# Patient Record
Sex: MALE | Race: WHITE | NOT HISPANIC OR LATINO
[De-identification: names, ages, dates, MRNs, and addresses within clinical notes are randomized per-mention and may not be internally consistent; named-entity substitution may affect disease eponyms.]

---

## 2020-06-05 PROBLEM — Z00.00 ENCOUNTER FOR PREVENTIVE HEALTH EXAMINATION: Status: ACTIVE | Noted: 2020-06-05

## 2020-06-09 ENCOUNTER — APPOINTMENT (OUTPATIENT)
Dept: NEUROLOGY | Facility: CLINIC | Age: 55
End: 2020-06-09
Payer: MEDICAID

## 2020-06-09 DIAGNOSIS — Z83.3 FAMILY HISTORY OF DIABETES MELLITUS: ICD-10-CM

## 2020-06-09 DIAGNOSIS — E78.5 HYPERLIPIDEMIA, UNSPECIFIED: ICD-10-CM

## 2020-06-09 DIAGNOSIS — Z78.9 OTHER SPECIFIED HEALTH STATUS: ICD-10-CM

## 2020-06-09 DIAGNOSIS — R51 HEADACHE: ICD-10-CM

## 2020-06-09 DIAGNOSIS — Z87.898 PERSONAL HISTORY OF OTHER SPECIFIED CONDITIONS: ICD-10-CM

## 2020-06-09 DIAGNOSIS — Z87.891 PERSONAL HISTORY OF NICOTINE DEPENDENCE: ICD-10-CM

## 2020-06-09 DIAGNOSIS — R41.3 OTHER AMNESIA: ICD-10-CM

## 2020-06-09 DIAGNOSIS — E11.9 TYPE 2 DIABETES MELLITUS W/OUT COMPLICATIONS: ICD-10-CM

## 2020-06-09 DIAGNOSIS — I10 ESSENTIAL (PRIMARY) HYPERTENSION: ICD-10-CM

## 2020-06-09 PROCEDURE — 99205 OFFICE O/P NEW HI 60 MIN: CPT | Mod: 95

## 2020-06-09 RX ORDER — ELECTROLYTES/DEXTROSE
SOLUTION, ORAL ORAL DAILY
Qty: 30 | Refills: 0 | Status: ACTIVE | COMMUNITY
Start: 2020-06-09

## 2020-06-09 RX ORDER — RAMIPRIL 2.5 MG/1
2.5 CAPSULE ORAL DAILY
Qty: 30 | Refills: 0 | Status: ACTIVE | COMMUNITY
Start: 2020-06-09

## 2020-06-10 NOTE — HISTORY OF PRESENT ILLNESS
[FreeTextEntry1] : This appointment is conducted primarily via real-time two-way audiovisual technology due to the current COVID-19 pandemic. The full explanation of recommendations at the end of the encounter had to be completed by telephone due to technical difficulties. Verbal consent for this encounter was received by the patient. The patient was located at the home address, and I was located in [place], [place]. This is a 54-year-old right-handed man who states that over the past month, he has been experiencing persistent throbbing pain at the left front part of his head, rated 6-7/10 throughout the day, non-radiating, without identifiable aggravating or alleviating factor. The headaches have been associated with blurry vision, light sensitivity, dizziness, and nightmares with concerns that people are present in his bedroom who are not there. There is no associated double vision, darkened vision, hearing changes, sound sensitivity, nausea, vomiting, or room-spinning sensation.\par PCP: Dr. Angel Johnston

## 2020-06-10 NOTE — PHYSICAL EXAM
[General Appearance - Alert] : alert [General Appearance - Well Nourished] : well nourished [General Appearance - Well Developed] : well developed [Impaired Insight] : insight and judgment were intact [Oriented To Time, Place, And Person] : oriented to person, place, and time [Affect] : the affect was normal [Person] : oriented to person [Place] : oriented to place [Time] : oriented to time [Concentration Intact] : normal concentrating ability [Visual Intact] : visual attention was ~T not ~L decreased [Naming Objects] : no difficulty naming common objects [Repeating Phrases] : no difficulty repeating a phrase [Fluency] : fluency intact [Comprehension] : comprehension intact [Cranial Nerves Optic (II)] : visual acuity intact bilaterally,  visual fields full to confrontation, pupils equal round and reactive to light [Cranial Nerves Oculomotor (III)] : extraocular motion intact [Cranial Nerves Trigeminal (V)] : facial sensation intact symmetrically [Cranial Nerves Facial (VII)] : face symmetrical [Cranial Nerves Accessory (XI - Cranial And Spinal)] : head turning and shoulder shrug symmetric [Cranial Nerves Glossopharyngeal (IX)] : tongue and palate midline [Cranial Nerves Hypoglossal (XII)] : there was no tongue deviation with protrusion [Motor Strength] : muscle strength was normal in all four extremities [Motor Tone] : muscle tone was normal in all four extremities [No Muscle Atrophy] : normal bulk in all four extremities [Sensation Tactile Decrease] : light touch was intact [Balance] : balance was intact [Motor Handedness Right-Handed] : the patient is right hand dominant [Sensation Pain / Temperature Decrease] : pain and temperature was intact [Sclera] : the sclera and conjunctiva were normal [Extraocular Movements] : extraocular movements were intact [Outer Ear] : the ears and nose were normal in appearance [Neck Appearance] : the appearance of the neck was normal [Edema] : there was no peripheral edema [Nail Clubbing] : no clubbing  or cyanosis of the fingernails [Abnormal Walk] : normal gait [Skin Color & Pigmentation] : normal skin color and pigmentation [] : no rash [Paresis Pronator Drift Right-Sided] : no pronator drift on the right [Paresis Pronator Drift Left-Sided] : no pronator drift on the left [Motor Strength Upper Extremities Bilaterally] : strength was normal in both upper extremities [Motor Strength Lower Extremities Bilaterally] : strength was normal in both lower extremities [Allodynia] : no ~T allodynia present [Romberg's Sign] : Romberg's sign was negtive [Tremor] : no tremor present [Past-pointing] : there was no past-pointing [Dysdiadochokinesia Bilaterally] : not present [Coordination - Dysmetria Impaired Finger-to-Nose Bilateral] : not present [Coordination - Dysmetria Impaired Heel-to-Shin Bilateral] : not present [FreeTextEntry4] : Immediate recall: 3/3. 5-minute delayed recall: 0/3 (recalls 1 of 3 words with category cue and 1 of 3 words with MCQ cue; cannot recall third word with either category or MCQ cue). [FreeTextEntry7] : No tenderness to palpation at forehead and scalp [FreeTextEntry8] : Normal, narrow-based gait. No difficulty with tiptoe, heel, and tandem gaits.

## 2020-10-12 ENCOUNTER — APPOINTMENT (OUTPATIENT)
Dept: NEUROLOGY | Facility: CLINIC | Age: 55
End: 2020-10-12
Payer: MEDICAID

## 2020-10-12 VITALS
HEART RATE: 61 BPM | DIASTOLIC BLOOD PRESSURE: 88 MMHG | SYSTOLIC BLOOD PRESSURE: 144 MMHG | WEIGHT: 178 LBS | BODY MASS INDEX: 27.94 KG/M2 | HEIGHT: 67 IN

## 2020-10-12 PROCEDURE — 99215 OFFICE O/P EST HI 40 MIN: CPT

## 2020-10-12 RX ORDER — OMEGA-3/DHA/EPA/FISH OIL 300-1000MG
400 CAPSULE ORAL
Qty: 90 | Refills: 1 | Status: DISCONTINUED | COMMUNITY
Start: 2020-06-09 | End: 2020-10-12

## 2020-10-12 RX ORDER — ATORVASTATIN CALCIUM 40 MG/1
40 TABLET, FILM COATED ORAL
Qty: 30 | Refills: 2 | Status: DISCONTINUED | COMMUNITY
Start: 2020-06-09 | End: 2020-10-12

## 2020-10-12 RX ORDER — ATORVASTATIN CALCIUM 20 MG/1
20 TABLET, FILM COATED ORAL
Qty: 30 | Refills: 0 | Status: ACTIVE | COMMUNITY
Start: 2020-09-23

## 2020-10-12 NOTE — ASSESSMENT
[FreeTextEntry1] : 54 RHM with decreased frequency of chronic headache, but with persistent mild cognitive impairment with memory loss.

## 2020-10-12 NOTE — PHYSICAL EXAM
[General Appearance - Alert] : alert [General Appearance - Well Nourished] : well nourished [Oriented To Time, Place, And Person] : oriented to person, place, and time [Affect] : the affect was normal [Person] : oriented to person [Concentration Intact] : normal concentrating ability [Visual Intact] : visual attention was ~T not ~L decreased [Naming Objects] : no difficulty naming common objects [Repeating Phrases] : no difficulty repeating a phrase [Fluency] : fluency intact [Comprehension] : comprehension intact [Cranial Nerves Optic (II)] : visual acuity intact bilaterally,  visual fields full to confrontation, pupils equal round and reactive to light [Cranial Nerves Oculomotor (III)] : extraocular motion intact [Cranial Nerves Trigeminal (V)] : facial sensation intact symmetrically [Cranial Nerves Facial (VII)] : face symmetrical [Cranial Nerves Glossopharyngeal (IX)] : tongue and palate midline [Cranial Nerves Accessory (XI - Cranial And Spinal)] : head turning and shoulder shrug symmetric [Cranial Nerves Hypoglossal (XII)] : there was no tongue deviation with protrusion [Motor Tone] : muscle tone was normal in all four extremities [Motor Strength] : muscle strength was normal in all four extremities [No Muscle Atrophy] : normal bulk in all four extremities [Motor Handedness Right-Handed] : the patient is right hand dominant [Sensation Tactile Decrease] : light touch was intact [Sensation Pain / Temperature Decrease] : pain and temperature was intact [Abnormal Walk] : normal gait [Balance] : balance was intact [Sclera] : the sclera and conjunctiva were normal [Extraocular Movements] : extraocular movements were intact [Outer Ear] : the ears and nose were normal in appearance [Neck Appearance] : the appearance of the neck was normal [Edema] : there was no peripheral edema [Nail Clubbing] : no clubbing  or cyanosis of the fingernails [Skin Color & Pigmentation] : normal skin color and pigmentation [General Appearance - In No Acute Distress] : in no acute distress [Place] : disoriented to place [Time] : disoriented to time [Paresis Pronator Drift Right-Sided] : no pronator drift on the right [Paresis Pronator Drift Left-Sided] : no pronator drift on the left [Motor Strength Upper Extremities Bilaterally] : strength was normal in both upper extremities [Motor Strength Lower Extremities Bilaterally] : strength was normal in both lower extremities [Romberg's Sign] : Romberg's sign was negtive [Allodynia] : no ~T allodynia present [Past-pointing] : there was no past-pointing [Tremor] : no tremor present [Dysdiadochokinesia Bilaterally] : not present [Coordination - Dysmetria Impaired Finger-to-Nose Bilateral] : not present [Coordination - Dysmetria Impaired Heel-to-Shin Bilateral] : not present [2+] : Patella left 2+ [1+] : Ankle jerk right 1+ [Plantar Reflex Right Only] : normal on the right [Plantar Reflex Left Only] : normal on the left [___] : absent on the right [___] : absent on the left [FreeTextEntry4] : Immediate recall: 3/3. 5-minute delayed recall: 0/3 (recalls 2 of 3 words with category cue; cannot recall third word with either category or MCQ cue). [FreeTextEntry7] : No tenderness to palpation at forehead and scalp [FreeTextEntry8] : Normal, narrow-based gait. No difficulty with tiptoe and heel gaits. Some difficulty with tandem gait, but without loss of balance. [PERRL With Normal Accommodation] : pupils were equal in size, round, reactive to light, with normal accommodation [Oropharynx] : the oropharynx was normal [] : no respiratory distress [Auscultation Breath Sounds / Voice Sounds] : lungs were clear to auscultation bilaterally [Heart Rate And Rhythm] : heart rate was normal and rhythm regular [Heart Sounds] : normal S1 and S2 [Arterial Pulses Carotid] : carotid pulses were normal with no bruits [Full Pulse] : the pedal pulses are present [Abdomen Soft] : soft [Abdomen Tenderness] : non-tender [No CVA Tenderness] : no ~M costovertebral angle tenderness [No Spinal Tenderness] : no spinal tenderness
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
## 2020-10-12 NOTE — DATA REVIEWED
[FreeTextEntry1] : MRI Brain (6/23/20): Per report,\par - No acute intracranial abnormalities\par - Chronic microvascular disease\par - Mild diffuse atrophy\par

## 2020-10-22 ENCOUNTER — APPOINTMENT (OUTPATIENT)
Dept: NEUROLOGY | Facility: CLINIC | Age: 55
End: 2020-10-22
Payer: MEDICAID

## 2020-10-22 PROCEDURE — 99443: CPT

## 2020-10-27 ENCOUNTER — APPOINTMENT (OUTPATIENT)
Dept: NEUROLOGY | Facility: CLINIC | Age: 55
End: 2020-10-27
Payer: MEDICAID

## 2020-10-27 VITALS
HEIGHT: 67 IN | DIASTOLIC BLOOD PRESSURE: 89 MMHG | SYSTOLIC BLOOD PRESSURE: 148 MMHG | HEART RATE: 71 BPM | BODY MASS INDEX: 27.62 KG/M2 | WEIGHT: 176 LBS

## 2020-10-27 VITALS — TEMPERATURE: 97.8 F

## 2020-10-27 PROCEDURE — 99215 OFFICE O/P EST HI 40 MIN: CPT | Mod: 25

## 2020-10-27 PROCEDURE — 99072 ADDL SUPL MATRL&STAF TM PHE: CPT

## 2020-10-27 PROCEDURE — 96116 NUBHVL XM PHYS/QHP 1ST HR: CPT | Mod: 59

## 2020-10-29 ENCOUNTER — LABORATORY RESULT (OUTPATIENT)
Age: 55
End: 2020-10-29

## 2020-10-30 NOTE — ASSESSMENT
[FreeTextEntry1] : 54 RHM with decreased frequency of chronic headache, but with concern for early dementia, characterized by deficits across multiple cognitive domains, including short-term recall, executive function, visuospatial function, attention, working memory, fluency, abstract thinking, orientation, and praxis.
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
## 2020-10-30 NOTE — PHYSICAL EXAM
[General Appearance - Alert] : alert [General Appearance - In No Acute Distress] : in no acute distress [General Appearance - Well Nourished] : well nourished [Oriented To Time, Place, And Person] : oriented to person, place, and time [Affect] : the affect was normal [Person] : oriented to person [Visual Intact] : visual attention was ~T not ~L decreased [Naming Objects] : no difficulty naming common objects [Comprehension] : comprehension intact [Cranial Nerves Optic (II)] : visual acuity intact bilaterally,  visual fields full to confrontation, pupils equal round and reactive to light [Cranial Nerves Oculomotor (III)] : extraocular motion intact [Cranial Nerves Trigeminal (V)] : facial sensation intact symmetrically [Cranial Nerves Facial (VII)] : face symmetrical [Cranial Nerves Glossopharyngeal (IX)] : tongue and palate midline [Cranial Nerves Accessory (XI - Cranial And Spinal)] : head turning and shoulder shrug symmetric [Cranial Nerves Hypoglossal (XII)] : there was no tongue deviation with protrusion [Motor Tone] : muscle tone was normal in all four extremities [Motor Strength] : muscle strength was normal in all four extremities [No Muscle Atrophy] : normal bulk in all four extremities [Motor Handedness Right-Handed] : the patient is right hand dominant [Sensation Tactile Decrease] : light touch was intact [Sensation Pain / Temperature Decrease] : pain and temperature was intact [Abnormal Walk] : normal gait [Balance] : balance was intact [2+] : Patella left 2+ [1+] : Ankle jerk right 1+ [Sclera] : the sclera and conjunctiva were normal [PERRL With Normal Accommodation] : pupils were equal in size, round, reactive to light, with normal accommodation [Extraocular Movements] : extraocular movements were intact [Outer Ear] : the ears and nose were normal in appearance [Oropharynx] : the oropharynx was normal [Neck Appearance] : the appearance of the neck was normal [Auscultation Breath Sounds / Voice Sounds] : lungs were clear to auscultation bilaterally [Heart Rate And Rhythm] : heart rate was normal and rhythm regular [Heart Sounds] : normal S1 and S2 [Arterial Pulses Carotid] : carotid pulses were normal with no bruits [Full Pulse] : the pedal pulses are present [Edema] : there was no peripheral edema [Abdomen Soft] : soft [Abdomen Tenderness] : non-tender [No CVA Tenderness] : no ~M costovertebral angle tenderness [No Spinal Tenderness] : no spinal tenderness [Nail Clubbing] : no clubbing  or cyanosis of the fingernails [Skin Color & Pigmentation] : normal skin color and pigmentation [] : no rash [___ / 30] : the patient achieved a total score of [unfilled] /30 [___ / 5] : Visuospatial / Executive: [unfilled] / 5 [0 / 0] : Memory: 0 / 0 [___ / 3] : Attention (Serial 7 subtraction): [unfilled] / 3 [___ / 1] : Fluency: [unfilled] / 1 [___ / 2] : Abstraction: [unfilled] / 2 [___ / 5] : Delayed Recall: [unfilled] / 5 [___ / 6] : Orientation: [unfilled] / 6 [FreeTextEntry1] : Neurocognitive Examination Functionality Questionnaire\par \par Relationship: Wife\par Frequency of interactions in the past month: Daily / Lives with\par \par Kar[place] Index of [place] in Activities of Daily Living:\par 1. Bathing/Showerin\par 2. Dressin\par 3. Toiletin\par 4. Transferrin\par 5. Continence: 1\par 6: Feedin\par \par TOTAL: 6\par \par \par [place] Instrumental Activities of Daily Living:\par A. Ability to Use Telephone: 1\par B. Shoppin (usually needs to be accompanied)\par C. Food Preparation: 0 (defers to wife)\par D. Housekeepin (defers to wife)\par E. Laundry: 0 (defers to wife)\par F. Transportation: 1 (drives locally)\par G. Responsibility for Own Medications: 1\par H: Ability to Handle Finances: 1 (can do online banking)\par \par TOTAL: 4\par \par Extended Neurobehavioral Status Testing and interpretation are outlined in the Procedure section.\par  [Place] : disoriented to place [Time] : disoriented to time [Concentration Intact] : a decrease in concentrating ability was observed [Repeating Phrases] : difficulty repeating a phrase [Fluency] : fluency not intact [Paresis Pronator Drift Right-Sided] : no pronator drift on the right [Paresis Pronator Drift Left-Sided] : no pronator drift on the left [Motor Strength Upper Extremities Bilaterally] : strength was normal in both upper extremities [Motor Strength Lower Extremities Bilaterally] : strength was normal in both lower extremities [Romberg's Sign] : Romberg's sign was negtive [Allodynia] : no ~T allodynia present [Past-pointing] : there was no past-pointing [Tremor] : no tremor present [Dysdiadochokinesia Bilaterally] : not present [Coordination - Dysmetria Impaired Finger-to-Nose Bilateral] : not present [Coordination - Dysmetria Impaired Heel-to-Shin Bilateral] : not present [Plantar Reflex Right Only] : normal on the right [Plantar Reflex Left Only] : normal on the left [___] : absent on the right [___] : absent on the left [FreeTextEntry7] : No tenderness to palpation at forehead and scalp [FreeTextEntry8] : Normal, narrow-based gait. No difficulty with tiptoe and heel gaits. Some difficulty with tandem gait, but without loss of balance.

## 2020-10-30 NOTE — DATA REVIEWED
[de-identified] : June 23, 2020:\par - No acute intracranial abnormalities\par - Chronic microvascular disease\par - Mild diffuse atrophy [FreeTextEntry1] : \par
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
## 2020-11-19 ENCOUNTER — APPOINTMENT (OUTPATIENT)
Dept: NEUROLOGY | Facility: CLINIC | Age: 55
End: 2020-11-19
Payer: MEDICAID

## 2020-11-19 DIAGNOSIS — F02.80 ALZHEIMER'S DISEASE WITH EARLY ONSET: ICD-10-CM

## 2020-11-19 DIAGNOSIS — G30.0 ALZHEIMER'S DISEASE WITH EARLY ONSET: ICD-10-CM

## 2020-11-19 PROCEDURE — 99215 OFFICE O/P EST HI 40 MIN: CPT | Mod: 95
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
## 2020-12-04 NOTE — PHYSICAL EXAM
[General Appearance - Alert] : alert [General Appearance - In No Acute Distress] : in no acute distress [General Appearance - Well Nourished] : well nourished [Oriented To Time, Place, And Person] : oriented to person, place, and time [Affect] : the affect was normal [Person] : oriented to person [Visual Intact] : visual attention was ~T not ~L decreased [Naming Objects] : no difficulty naming common objects [Comprehension] : comprehension intact [___ / 30] : the patient achieved a total score of [unfilled] /30 [___ / 5] : Visuospatial / Executive: [unfilled] / 5 [0 / 0] : Memory: 0 / 0 [___ / 3] : Attention (Serial 7 subtraction): [unfilled] / 3 [___ / 1] : Fluency: [unfilled] / 1 [___ / 2] : Abstraction: [unfilled] / 2 [___ / 5] : Delayed Recall: [unfilled] / 5 [___ / 6] : Orientation: [unfilled] / 6 [Cranial Nerves Optic (II)] : visual acuity intact bilaterally,  visual fields full to confrontation, pupils equal round and reactive to light [Cranial Nerves Oculomotor (III)] : extraocular motion intact [Cranial Nerves Trigeminal (V)] : facial sensation intact symmetrically [Cranial Nerves Facial (VII)] : face symmetrical [Cranial Nerves Glossopharyngeal (IX)] : tongue and palate midline [Cranial Nerves Accessory (XI - Cranial And Spinal)] : head turning and shoulder shrug symmetric [Cranial Nerves Hypoglossal (XII)] : there was no tongue deviation with protrusion [Motor Tone] : muscle tone was normal in all four extremities [Motor Strength] : muscle strength was normal in all four extremities [No Muscle Atrophy] : normal bulk in all four extremities [Motor Handedness Right-Handed] : the patient is right hand dominant [Sensation Tactile Decrease] : light touch was intact [Sensation Pain / Temperature Decrease] : pain and temperature was intact [Abnormal Walk] : normal gait [Balance] : balance was intact [Sclera] : the sclera and conjunctiva were normal [Extraocular Movements] : extraocular movements were intact [Outer Ear] : the ears and nose were normal in appearance [Oropharynx] : the oropharynx was normal [Neck Appearance] : the appearance of the neck was normal [Skin Color & Pigmentation] : normal skin color and pigmentation [] : no rash [FreeTextEntry1] : Neurocognitive Examination Functionality Questionnaire\par \par Relationship: Wife\par Frequency of interactions in the past month: Daily / Lives with\par \par Karimi Index of [place] in Activities of Daily Living:\par 1. Bathing/Showerin\par 2. Dressin\par 3. Toiletin\par 4. Transferrin\par 5. Continence: 1\par 6: Feedin\par \par TOTAL: 6\par \par \par Lyla-Everett Instrumental Activities of Daily Living:\par A. Ability to Use Telephone: 1\par B. Shoppin (usually needs to be accompanied)\par C. Food Preparation: 0 (defers to wife)\par D. Housekeepin (defers to wife)\par E. Laundry: 0 (defers to wife)\par F. Transportation: 1 (drives locally)\par G. Responsibility for Own Medications: 1\par H: Ability to Handle Finances: 1 (can do online banking)\par \par TOTAL: 4\par \par Extended Neurobehavioral Status Testing and interpretation are outlined in the Procedure section.\par  [Place] : disoriented to place [Time] : disoriented to time [Concentration Intact] : a decrease in concentrating ability was observed [Repeating Phrases] : difficulty repeating a phrase [Fluency] : fluency not intact [Paresis Pronator Drift Right-Sided] : no pronator drift on the right [Paresis Pronator Drift Left-Sided] : no pronator drift on the left [Motor Strength Upper Extremities Bilaterally] : strength was normal in both upper extremities [Motor Strength Lower Extremities Bilaterally] : strength was normal in both lower extremities [Romberg's Sign] : Romberg's sign was negtive [Allodynia] : no ~T allodynia present [Past-pointing] : there was no past-pointing [Tremor] : no tremor present [Coordination - Dysmetria Impaired Finger-to-Nose Bilateral] : not present [Coordination - Dysmetria Impaired Heel-to-Shin Bilateral] : not present [FreeTextEntry7] : No tenderness to palpation at forehead and scalp [FreeTextEntry8] : Normal, narrow-based gait. No difficulty with tiptoe and heel gaits. Some difficulty with tandem gait, but without loss of balance.

## 2020-12-04 NOTE — ASSESSMENT
[FreeTextEntry1] : 55 RHM with decreased frequency of chronic headache, and with neuroimaging evidence for early dementia, characterized by deficits across multiple cognitive domains, including short-term recall, executive function, visuospatial function, attention, working memory, fluency, abstract thinking, orientation, and praxis.

## 2020-12-04 NOTE — DATA REVIEWED
[de-identified] : June 23, 2020:\par - No acute intracranial abnormalities\par - Chronic microvascular disease\par - Mild diffuse atrophy [de-identified] : November 11, 2020:\par - Decreased activity throughout cerebral cortex (mostly parietal and temporal regions) [FreeTextEntry1] : \par

## 2021-01-10 ENCOUNTER — RX RENEWAL (OUTPATIENT)
Age: 56
End: 2021-01-10

## 2021-01-19 ENCOUNTER — APPOINTMENT (OUTPATIENT)
Dept: NEUROLOGY | Facility: CLINIC | Age: 56
End: 2021-01-19
Payer: MEDICAID

## 2021-01-19 VITALS
HEIGHT: 67 IN | DIASTOLIC BLOOD PRESSURE: 76 MMHG | BODY MASS INDEX: 28.56 KG/M2 | WEIGHT: 182 LBS | SYSTOLIC BLOOD PRESSURE: 137 MMHG | HEART RATE: 60 BPM

## 2021-01-19 PROCEDURE — 96116 NUBHVL XM PHYS/QHP 1ST HR: CPT | Mod: 59

## 2021-01-19 PROCEDURE — 99215 OFFICE O/P EST HI 40 MIN: CPT | Mod: 25

## 2021-01-19 PROCEDURE — 99072 ADDL SUPL MATRL&STAF TM PHE: CPT

## 2021-01-19 RX ORDER — DAPAGLIFLOZIN AND METFORMIN HYDROCHLORIDE 5; 1000 MG/1; MG/1
5-1000 TABLET, FILM COATED, EXTENDED RELEASE ORAL DAILY
Qty: 30 | Refills: 0 | Status: DISCONTINUED | COMMUNITY
Start: 2020-06-09 | End: 2021-01-19

## 2021-01-19 RX ORDER — EMPAGLIFLOZIN AND METFORMIN HYDROCHLORIDE 12.5; 5 MG/1; MG/1
12.5-5 TABLET ORAL
Refills: 0 | Status: ACTIVE | COMMUNITY

## 2021-01-20 RX ORDER — IBUPROFEN 600 MG/1
600 TABLET, FILM COATED ORAL
Qty: 16 | Refills: 0 | Status: ACTIVE | COMMUNITY
Start: 2020-12-22

## 2021-01-20 NOTE — PHYSICAL EXAM
[General Appearance - Alert] : alert [General Appearance - In No Acute Distress] : in no acute distress [General Appearance - Well Nourished] : well nourished [Affect] : the affect was normal [Person] : oriented to person [Visual Intact] : visual attention was ~T not ~L decreased [Comprehension] : comprehension intact [Cranial Nerves Optic (II)] : visual acuity intact bilaterally,  visual fields full to confrontation, pupils equal round and reactive to light [Cranial Nerves Oculomotor (III)] : extraocular motion intact [Cranial Nerves Trigeminal (V)] : facial sensation intact symmetrically [Cranial Nerves Facial (VII)] : face symmetrical [Cranial Nerves Glossopharyngeal (IX)] : tongue and palate midline [Cranial Nerves Accessory (XI - Cranial And Spinal)] : head turning and shoulder shrug symmetric [Cranial Nerves Hypoglossal (XII)] : there was no tongue deviation with protrusion [Motor Tone] : muscle tone was normal in all four extremities [Motor Strength] : muscle strength was normal in all four extremities [No Muscle Atrophy] : normal bulk in all four extremities [Motor Handedness Right-Handed] : the patient is right hand dominant [Sensation Tactile Decrease] : light touch was intact [Sensation Pain / Temperature Decrease] : pain and temperature was intact [Abnormal Walk] : normal gait [Balance] : balance was intact [Sclera] : the sclera and conjunctiva were normal [Extraocular Movements] : extraocular movements were intact [Outer Ear] : the ears and nose were normal in appearance [Oropharynx] : the oropharynx was normal [Neck Appearance] : the appearance of the neck was normal [Skin Color & Pigmentation] : normal skin color and pigmentation [] : no rash [FreeTextEntry1] : Neurocognitive Examination Functionality Questionnaire\par \par Relationship: Wife\par Frequency of interactions in the past month: Daily / Lives with\par \par Karimi Index of [place] in Activities of Daily Living:\par 1. Bathing/Showerin\par 2. Dressin (sometimes wears clothes backwards and sometimes forgets to hang clothes)\par 3. Toiletin\par 4. Transferrin\par 5. Continence: 1\par 6: Feedin\par \par TOTAL: 6\par \par \par Lyla-Everett Instrumental Activities of Daily Living:\par A. Ability to Use Telephone: 1\par B. Shoppin (usually needs to be accompanied)\par C. Food Preparation: 0 (defers to wife)\par D. Housekeepin (defers to wife)\par E. Laundry: 0 (defers to wife)\par F. Transportation: 1 (rare driving locally because he appears anxious)\par G. Responsibility for Own Medications: 1\par H: Ability to Handle Finances: 0 (can no longer do online banking; can withdraw money from [employer])\par \par TOTAL: 3\par \par Extended Neurobehavioral Status Testing and interpretation are outlined in the Procedure section.\par  [Oriented to Person] : oriented to person [Oriented to Place] : oriented to place [Oriented to Time] : disoriented to time [Place] : oriented to place [Time] : disoriented to time [Concentration Intact] : a decrease in concentrating ability was observed [Naming Objects] : difficulty naming common objects [Repeating Phrases] : difficulty repeating a phrase [Fluency] : fluency not intact [___ / 30] : the patient achieved a total score of [unfilled] /30 [___ / 5] : Visuospatial / Executive: [unfilled] / 5 [0 / 0] : Memory: 0 / 0 [___ / 3] : Attention (Serial 7 subtraction): [unfilled] / 3 [___ / 1] : Fluency: [unfilled] / 1 [___ / 2] : Abstraction: [unfilled] / 2 [___ / 5] : Delayed Recall: [unfilled] / 5 [___ / 6] : Orientation: [unfilled] / 6 [Paresis Pronator Drift Right-Sided] : no pronator drift on the right [Paresis Pronator Drift Left-Sided] : no pronator drift on the left [Motor Strength Lower Extremities Bilaterally] : strength was normal in both lower extremities [Motor Strength Upper Extremities Bilaterally] : strength was normal in both upper extremities [Romberg's Sign] : Romberg's sign was negtive [Allodynia] : no ~T allodynia present [Past-pointing] : there was no past-pointing [Tremor] : no tremor present [Coordination - Dysmetria Impaired Finger-to-Nose Bilateral] : not present [Coordination - Dysmetria Impaired Heel-to-Shin Bilateral] : not present [2+] : Patella left 2+ [1+] : Ankle jerk left 1+ [Plantar Reflex Right Only] : normal on the right [Plantar Reflex Left Only] : normal on the left [___] : absent on the right [___] : absent on the left [FreeTextEntry7] : No tenderness to palpation at forehead and scalp [FreeTextEntry8] : Normal, narrow-based gait. No difficulty with tiptoe and heel gaits. Some difficulty with tandem gait, but without falling. [PERRL With Normal Accommodation] : pupils were equal in size, round, reactive to light, with normal accommodation [Auscultation Breath Sounds / Voice Sounds] : lungs were clear to auscultation bilaterally [Heart Rate And Rhythm] : heart rate was normal and rhythm regular [Heart Sounds] : normal S1 and S2 [Arterial Pulses Carotid] : carotid pulses were normal with no bruits [Full Pulse] : the pedal pulses are present [Abdomen Soft] : soft [Abdomen Tenderness] : non-tender [No CVA Tenderness] : no ~M costovertebral angle tenderness [No Spinal Tenderness] : no spinal tenderness [Nail Clubbing] : no clubbing  or cyanosis of the fingernails

## 2021-01-20 NOTE — PROCEDURE
[FreeTextEntry1] : EXTENDED NEUROBEHAVIORAL STATUS TESTING\par \par [This is a separate procedure note for the Neurobehavioral Status Examination performed during this encounter.]\par \par Mr. MOULTON was awake and alert, well groomed, and in no acute distress. He had mostly fluent speech, but made some paraphasic errors. There was some anomia in conversation. Comprehension was limited due to hearing deficit at the right ear, requiring repetition of some commands. He was engaged in the discussion. He recounted some of his history, but deferred to his wife to provide additional history. He did not appear anxious or depressed.\par \par Recent memory: Mr. MOULTON can name the president, but can only broadly say what he did yesterday.\par \par MoCA (Version 7.2) score out of 30: 11\par \par Memory Index Score (MIS) out of 15: 3\par \par Visuospatial/Executive\par      Trails: (-1) Unable to continue past the number 2; draws lines within circles instead of between circles\par      Rectangle: (-1) Two-dimensional figure\par      Clock: (-3) Incomplete [place]; Draws only numbers 1-6; No hands drawn\par \par Naming: (-1) Does not know "hippopotamus"\par \par Memory (Registration): 0/5 on first trial --> Words repeated --> 2/5 on second trial\par \par Attention:\par      Digit span 5 Forward: Intact\par      Digit span 3 Reverse: Intact\par      Letter A test: (-1) 3 errors\par      Serial 7 subtraction: (-1) 16 --> 9 --> 2 --> Unable to continue\par \par Language:\par      Phrase repetition: (-2) Paraphasic errors made with both sentences\par      Word fluency (S): (-1) 2 words\par \par Abstraction:\par      Steffi/Mis: "Gems"\par      Blank/Rifle: (-1) "Guns"\par \par Delayed recall score out of 5: (-5) 0 words\par      Additional words with category cue: 0 words (incorrectly recalls 2 words with category cues)\par      Additional words with multiple choice cue: 3 words (incorrectly recalls 2 words with MCQ cues)\par \par Orientation: (-2) Does not know current date and month\par \par \par Additional Neurobehavioral status tests:\par \par Animal naming fluency: 5 words\par \par Praxis:\par \par Ideomotor limb\par Transitive:\par      Brush hair: Intact b/l\par      Cut loaf: Intact b/l\par Intransitive:\par      Wave hello: Intact b/l\par      Motion "come here": Intact b/l\par Meaningless gestures: Intact to 2/4\par \par Right/Left Orientation:\par      "Show me your right hand": Correct\par      "Show me your left hand": Correct\par      "With your right hand, point to your right ear": Incorrect (uses left hand to point to left ear)\par      "With your left hand, point to your right shoulder":Incorrect (uses right hand to point to left shoulder)\par      "With your right hand, point to my right ear": Correct (initially points to my left ear, but corrects himself without prompting)\par      "With your left hand, point to my right shoulder": Incorrect (points to my left shoulder)\par \par INTERPRETATION:\par \par I have carefully reviewed the above neurobehavioral status testing results. The cognitive domains are listed below, as well as my interpretation of whether or not there is an impairment or if performance was within normal limits. This differs from standard neuropsychological testing, since the raw scores alone on different validated batteries of tests may not detect or may overestimate subtle deficits.\par \par Cognitive domains:\par      Attention: Decreased\par      Working Memory: Decreased\par      Executive Function: Decreased\par      Language: Phonemic fluency & Semantic fluency decreased\par      Memory: Decreased\par      Visuospatial Function: Decreased\par      Praxis: Decreased\par      Behavior/Mood: Both normal\par      Other comments: None\par \par 60 minutes were spent administering and interpreting the extended neurobehavioral status testing, as well as preparing this report.\par \par Testing start time: 2:30 pm\par Testing end time: 3:00 pm\par Report time: 30 minutes\par \par

## 2021-01-20 NOTE — ASSESSMENT
[FreeTextEntry1] : 55 RHM with decreased frequency of chronic headache, and with neuroimaging evidence for early-onset dementia, characterized by deficits across multiple cognitive domains, including short-term recall, executive function, visuospatial function, attention, working memory, fluency, abstract thinking, orientation, and praxis.

## 2021-01-20 NOTE — DATA REVIEWED
[de-identified] : June 23, 2020:\par - No acute intracranial abnormalities\par - Chronic microvascular disease\par - Mild diffuse atrophy [de-identified] : November 11, 2020:\par - Decreased activity throughout cerebral cortex (mostly parietal and temporal regions) [FreeTextEntry1] : \par
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
## 2021-04-05 LAB
ALBUMIN SERPL ELPH-MCNC: 4.9 G/DL
ALP BLD-CCNC: 136 U/L
ALT SERPL-CCNC: 15 U/L
ANA SER IF-ACNC: NEGATIVE
ANION GAP SERPL CALC-SCNC: 14 MMOL/L
AST SERPL-CCNC: 20 U/L
B BURGDOR IGG+IGM SER QL IB: NORMAL
BASOPHILS # BLD AUTO: 0.04 K/UL
BASOPHILS NFR BLD AUTO: 0.5 %
BILIRUB SERPL-MCNC: 0.3 MG/DL
BUN SERPL-MCNC: 22 MG/DL
CALCIUM SERPL-MCNC: 9.7 MG/DL
CHLORIDE SERPL-SCNC: 100 MMOL/L
CO2 SERPL-SCNC: 24 MMOL/L
CREAT SERPL-MCNC: 1.12 MG/DL
CRP SERPL-MCNC: 0.11 MG/DL
EOSINOPHIL # BLD AUTO: 0.18 K/UL
EOSINOPHIL NFR BLD AUTO: 2.5 %
ERYTHROCYTE [SEDIMENTATION RATE] IN BLOOD BY WESTERGREN METHOD: 35 MM/HR
ESTIMATED AVERAGE GLUCOSE: 146 MG/DL
FOLATE SERPL-MCNC: 10.3 NG/ML
GLUCOSE SERPL-MCNC: 90 MG/DL
HBA1C MFR BLD HPLC: 6.7 %
HCT VFR BLD CALC: 44.9 %
HGB BLD-MCNC: 13 G/DL
IMM GRANULOCYTES NFR BLD AUTO: 0.3 %
LYMPHOCYTES # BLD AUTO: 2.18 K/UL
LYMPHOCYTES NFR BLD AUTO: 29.7 %
MAGNESIUM SERPL-MCNC: 2.2 MG/DL
MAN DIFF?: NORMAL
MCHC RBC-ENTMCNC: 23 PG
MCHC RBC-ENTMCNC: 29 GM/DL
MCV RBC AUTO: 79.5 FL
MONOCYTES # BLD AUTO: 0.54 K/UL
MONOCYTES NFR BLD AUTO: 7.4 %
NEUTROPHILS # BLD AUTO: 4.37 K/UL
NEUTROPHILS NFR BLD AUTO: 59.6 %
PHOSPHATE SERPL-MCNC: 4.2 MG/DL
PLATELET # BLD AUTO: 240 K/UL
POTASSIUM SERPL-SCNC: 4.5 MMOL/L
PROT SERPL-MCNC: 7.9 G/DL
RBC # BLD: 5.65 M/UL
RBC # FLD: 15.1 %
SODIUM SERPL-SCNC: 139 MMOL/L
T PALLIDUM AB SER QL IA: NEGATIVE
TSH SERPL-ACNC: 1.02 UIU/ML
VIT B12 SERPL-MCNC: 788 PG/ML
WBC # FLD AUTO: 7.33 K/UL

## 2021-04-05 RX ORDER — MAGNESIUM OXIDE 241.3 MG/1000MG
400 TABLET ORAL
Qty: 90 | Refills: 0 | Status: ACTIVE | COMMUNITY
Start: 2020-06-09 | End: 1900-01-01

## 2021-04-05 RX ORDER — UBIDECARENONE 200 MG
200 CAPSULE ORAL
Qty: 90 | Refills: 0 | Status: ACTIVE | COMMUNITY
Start: 2020-06-09 | End: 1900-01-01

## 2021-04-05 RX ORDER — RIBOFLAVIN (VITAMIN B2) 100 MG
100 TABLET ORAL
Qty: 90 | Refills: 0 | Status: ACTIVE | COMMUNITY
Start: 2020-06-09 | End: 1900-01-01

## 2021-04-18 ENCOUNTER — RX RENEWAL (OUTPATIENT)
Age: 56
End: 2021-04-18

## 2021-04-18 RX ORDER — DONEPEZIL HYDROCHLORIDE 5 MG/1
5 TABLET ORAL
Qty: 90 | Refills: 0 | Status: ACTIVE | COMMUNITY
Start: 2020-11-19 | End: 1900-01-01

## 2021-04-19 ENCOUNTER — APPOINTMENT (OUTPATIENT)
Dept: NEUROLOGY | Facility: CLINIC | Age: 56
End: 2021-04-19
Payer: MEDICAID

## 2021-04-19 PROCEDURE — 99443: CPT

## 2021-06-25 ENCOUNTER — APPOINTMENT (OUTPATIENT)
Dept: NEUROLOGY | Facility: CLINIC | Age: 56
End: 2021-06-25
Payer: MEDICAID

## 2021-06-25 VITALS — SYSTOLIC BLOOD PRESSURE: 161 MMHG | HEART RATE: 74 BPM | DIASTOLIC BLOOD PRESSURE: 92 MMHG

## 2021-06-25 VITALS — DIASTOLIC BLOOD PRESSURE: 89 MMHG | HEART RATE: 81 BPM | SYSTOLIC BLOOD PRESSURE: 139 MMHG

## 2021-06-25 VITALS
DIASTOLIC BLOOD PRESSURE: 89 MMHG | BODY MASS INDEX: 22.76 KG/M2 | SYSTOLIC BLOOD PRESSURE: 157 MMHG | HEIGHT: 67 IN | HEART RATE: 77 BPM | WEIGHT: 145 LBS

## 2021-06-25 DIAGNOSIS — C18.9 MALIGNANT NEOPLASM OF COLON, UNSPECIFIED: ICD-10-CM

## 2021-06-25 DIAGNOSIS — K59.00 CONSTIPATION, UNSPECIFIED: ICD-10-CM

## 2021-06-25 PROCEDURE — 96116 NUBHVL XM PHYS/QHP 1ST HR: CPT | Mod: 59

## 2021-06-25 PROCEDURE — 99417 PROLNG OP E/M EACH 15 MIN: CPT | Mod: 25

## 2021-06-25 PROCEDURE — 99215 OFFICE O/P EST HI 40 MIN: CPT | Mod: 25

## 2021-06-25 RX ORDER — DOCUSATE SODIUM 100 MG/1
100 CAPSULE, LIQUID FILLED ORAL TWICE DAILY
Qty: 60 | Refills: 0 | Status: ACTIVE | COMMUNITY
Start: 2021-06-25

## 2021-06-25 RX ORDER — METHYLPREDNISOLONE 4 MG/1
4 TABLET ORAL
Qty: 1 | Refills: 0 | Status: DISCONTINUED | COMMUNITY
Start: 2020-06-09 | End: 2021-06-25

## 2021-06-25 RX ORDER — PENICILLIN V POTASSIUM 500 MG/1
500 TABLET, FILM COATED ORAL
Qty: 28 | Refills: 0 | Status: DISCONTINUED | COMMUNITY
Start: 2020-12-22 | End: 2021-06-25

## 2021-06-25 RX ORDER — POLYETHYLENE GLYCOL 3350 17 G/17G
17 POWDER, FOR SOLUTION ORAL DAILY
Qty: 30 | Refills: 0 | Status: ACTIVE | COMMUNITY
Start: 2021-06-25

## 2021-06-25 RX ORDER — PREDNISOLONE ACETATE 10 MG/ML
1 SUSPENSION/ DROPS OPHTHALMIC
Qty: 5 | Refills: 0 | Status: ACTIVE | COMMUNITY
Start: 2021-06-17
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
## 2021-06-25 NOTE — ASSESSMENT
[FreeTextEntry1] : 55 RHM with decreased frequency of chronic headache, and with neuroimaging evidence for early-onset dementia, characterized by deficits across multiple cognitive domains, including short-term recall, executive function, visuospatial function, attention, working memory, fluency, abstract thinking, orientation, and praxis; with decline in cognitive status in the setting of Stage IV colon cancer.

## 2021-06-25 NOTE — PROCEDURE
[FreeTextEntry1] : EXTENDED NEUROBEHAVIORAL STATUS TESTING\par \par [This is a separate procedure note for the Neurobehavioral Status Examination performed during this encounter.]\par \par Mr. MOULTON was awake and alert, well groomed, and in no acute distress. He had mostly fluent speech, but made some paraphasic errors. There was anomia in conversation. Comprehension was limited due to hearing deficit at the right ear, requiring repetition of some commands. He was not fully engaged in the discussion. He relied on his wife to provide his interval history. He was anxious intermittently during the exam, but did not appear depressed.\par \par Recent memory: Mr. MOULTON can name the president, but cannot say what he did yesterday.\par \par MoCA (Version 7.3) score out of 30: 3\par \par Memory Index Score (MIS) out of 15: 1\par \par Visuospatial/Executive\par      Trails: (-1) Unable to complete\par      Cylinder: (-1) Traces part of the sample cylinder\par      Clock: (-3) Incomplete [place]; No numbers or hands drawn\par \par Naming: (-2) Can only name "Pig"\par \par Memory (Registration): 0/5 on first trial --> Words repeated --> 0/5 on second trial\par \par Attention:\par      Digit span 5 Forward: (-1) "1 5 6 7"\par      Digit span 3 Reverse: (-1) "5"\par      Letter A test: (-1) Does not tap at all despite repeated instructions\par      Serial 7 subtraction: (-1) 11 --> 11 --> "I don't know"\par \par Language:\par      Phrase repetition: (-2) Paraphasic errors made with both sentences\par      Word fluency (B): (-1) 7 words\par \par Abstraction:\par      Eye/Ear: (-1) "I don't know"\par      Trumpet/Piano: "They make music"\par \par Delayed recall score out of 5: (-5) 0 words\par      Additional words with category cue: 0 words (incorrectly recalls 1 word with category cues)\par      Additional words with multiple choice cue: 1 word (incorrectly recalls 0 words with MCQ cues)\par \par Orientation: (-5) Only knows place ("Doctor's office")\par \par \par Additional Neurobehavioral status tests:\par \par Vegetable naming fluency: 2 words\par \par Praxis:\par \par Ideomotor limb\par Transitive:\par      Brush teeth: Intact on right, Incorrect on left (gestures at a distance)\par      Cut loaf: Intact b/l\par Intransitive:\par      Wave goodbye Intact b/l\par      Motion "come here": Intact on left, Incorrect on right (scratches his right thigh)\par Meaningless gestures: Intact to 1/4\par \par Right/Left Orientation:\par      "Show me your right hand": Correct\par      "Show me your left hand": Correct\par      "With your right hand, point to your right ear": Correct\par      "With your left hand, point to your right shoulder":Incorrect (uses left hand to point to left ear)\par      "With your right hand, point to my right ear": Incorrect (points to a distant spot with his left hand)\par      "With your left hand, point to my right shoulder": Incorrect (points to a distant spot with his left hand)\par \par INTERPRETATION:\par \par I have carefully reviewed the above neurobehavioral status testing results. The cognitive domains are listed below, as well as my interpretation of whether or not there is an impairment or if performance was within normal limits. This differs from standard neuropsychological testing, since the raw scores alone on different validated batteries of tests may not detect or may overestimate subtle deficits.\par \par Cognitive domains:\par      Attention: Decreased\par      Working Memory: Decreased\par      Executive Function: Decreased\par      Language: Phonemic fluency & Semantic fluency decreased\par      Memory: Decreased\par      Visuospatial Function: Decreased\par      Praxis: Decreased\par      Behavior/Mood: Anxious behavior and mood\par      Other comments: Recent diagnosis of Stage IV colon cancer\par \par 60 minutes were spent administering and interpreting the extended neurobehavioral status testing, as well as preparing this report.\par \par Testing start time: 3:30 pm\par Testing end time: 4:00 pm\par Report time: 30 minutes\par \par

## 2021-06-25 NOTE — DATA REVIEWED
[de-identified] : June 23, 2020:\par - No acute intracranial abnormalities\par - Chronic microvascular disease\par - Mild diffuse atrophy [de-identified] : November 11, 2020:\par - Decreased activity throughout cerebral cortex (mostly parietal and temporal regions) [FreeTextEntry1] : \par

## 2021-06-25 NOTE — PHYSICAL EXAM
[General Appearance - Alert] : alert [General Appearance - In No Acute Distress] : in no acute distress [General Appearance - Well Nourished] : well nourished [Affect] : the affect was normal [Oriented to Person] : oriented to person [Person] : oriented to person [Visual Intact] : visual attention was ~T not ~L decreased [Comprehension] : comprehension intact [Cranial Nerves Optic (II)] : visual acuity intact bilaterally,  visual fields full to confrontation, pupils equal round and reactive to light [Cranial Nerves Oculomotor (III)] : extraocular motion intact [Cranial Nerves Trigeminal (V)] : facial sensation intact symmetrically [Cranial Nerves Facial (VII)] : face symmetrical [Cranial Nerves Glossopharyngeal (IX)] : tongue and palate midline [Cranial Nerves Accessory (XI - Cranial And Spinal)] : head turning and shoulder shrug symmetric [Cranial Nerves Hypoglossal (XII)] : there was no tongue deviation with protrusion [Motor Tone] : muscle tone was normal in all four extremities [Motor Strength] : muscle strength was normal in all four extremities [No Muscle Atrophy] : normal bulk in all four extremities [Motor Handedness Right-Handed] : the patient is right hand dominant [Sensation Tactile Decrease] : light touch was intact [Sensation Pain / Temperature Decrease] : pain and temperature was intact [Abnormal Walk] : normal gait [Balance] : balance was intact [2+] : Patella left 2+ [1+] : Ankle jerk left 1+ [Sclera] : the sclera and conjunctiva were normal [PERRL With Normal Accommodation] : pupils were equal in size, round, reactive to light, with normal accommodation [Extraocular Movements] : extraocular movements were intact [Outer Ear] : the ears and nose were normal in appearance [Neck Appearance] : the appearance of the neck was normal [Oropharynx] : the oropharynx was normal [Auscultation Breath Sounds / Voice Sounds] : lungs were clear to auscultation bilaterally [Heart Rate And Rhythm] : heart rate was normal and rhythm regular [Heart Sounds] : normal S1 and S2 [Full Pulse] : the pedal pulses are present [Arterial Pulses Carotid] : carotid pulses were normal with no bruits [Abdomen Soft] : soft [Abdomen Tenderness] : non-tender [No CVA Tenderness] : no ~M costovertebral angle tenderness [No Spinal Tenderness] : no spinal tenderness [Nail Clubbing] : no clubbing  or cyanosis of the fingernails [Skin Color & Pigmentation] : normal skin color and pigmentation [] : no rash [___ / 5] : Visuospatial / Executive: [unfilled] / 5 [0 / 0] : Memory: 0 / 0 [___ / 3] : Attention (Serial 7 subtraction): [unfilled] / 3 [___ / 1] : Fluency: [unfilled] / 1 [___ / 2] : Abstraction: [unfilled] / 2 [___ / 5] : Delayed Recall: [unfilled] / 5 [___ / 6] : Orientation: [unfilled] / 6 [Dysdiadochokinesia Bilaterally] : present bilaterally [FreeTextEntry1] : Neurocognitive Examination Functionality Questionnaire\par \par Relationship: Wife\par Frequency of interactions in the past month: Daily / Lives with\par \par [place] Index of [place] in Activities of Daily Living:\par 1. Bathing/Showerin\par 2. Dressin\par 3. Toiletin\par 4. Transferrin\par 5. Continence: 1\par 6: Feedin (decreased appetite, requires Ensure)\par \par TOTAL: 5\par \par \par [place] Instrumental Activities of Daily Living:\par A. Ability to Use Telephone: 0\par B. Shoppin (needs to be accompanied)\par C. Food Preparation: 0\par D. Housekeepin \par E. Laundry: 0\par F. Transportation: 1 (rare driving locally because of anxiety)\par G. Responsibility for Own Medications: 1\par H: Ability to Handle Finances: 0\par \par TOTAL: 2\par \par Extended Neurobehavioral Status Testing and interpretation are outlined in the Procedure section.\par  [Oriented to Place] : disoriented to place [Oriented to Time] : disoriented to time [Place] : disoriented to place [Time] : disoriented to time [Concentration Intact] : a decrease in concentrating ability was observed [Naming Objects] : difficulty naming common objects [Repeating Phrases] : difficulty repeating a phrase [Fluency] : fluency not intact [Paresis Pronator Drift Left-Sided] : no pronator drift on the left [Paresis Pronator Drift Right-Sided] : no pronator drift on the right [Motor Strength Upper Extremities Bilaterally] : strength was normal in both upper extremities [Motor Strength Lower Extremities Bilaterally] : strength was normal in both lower extremities [Romberg's Sign] : Romberg's sign was negtive [Allodynia] : no ~T allodynia present [Past-pointing] : there was no past-pointing [Tremor] : no tremor present [Coordination - Dysmetria Impaired Finger-to-Nose Bilateral] : not present [Coordination - Dysmetria Impaired Heel-to-Shin Bilateral] : not present [Plantar Reflex Right Only] : normal on the right [Plantar Reflex Left Only] : normal on the left [___] : absent on the right [___] : absent on the left [MocaTotal] : 3 [FreeTextEntry8] : Normal, narrow-based gait. No difficulty with tiptoe and heel gaits. Lists to left with tandem gait, but without falling. [FreeTextEntry7] : No tenderness to palpation at forehead and scalp

## 2021-06-25 NOTE — REVIEW OF SYSTEMS
[Negative] : Heme/Lymph [As Noted in HPI] : as noted in HPI [Recent Weight Loss (___ Lbs)] : recent [unfilled] ~Ulb weight loss

## 2021-06-29 ENCOUNTER — RX RENEWAL (OUTPATIENT)
Age: 56
End: 2021-06-29

## 2021-07-09 ENCOUNTER — TRANSCRIPTION ENCOUNTER (OUTPATIENT)
Age: 56
End: 2021-07-09

## 2021-07-29 ENCOUNTER — APPOINTMENT (OUTPATIENT)
Dept: NEUROLOGY | Facility: CLINIC | Age: 56
End: 2021-07-29
Payer: MEDICAID

## 2021-07-29 PROCEDURE — 99443: CPT

## 2021-09-23 ENCOUNTER — APPOINTMENT (OUTPATIENT)
Dept: NEUROLOGY | Facility: CLINIC | Age: 56
End: 2021-09-23